# Patient Record
(demographics unavailable — no encounter records)

---

## 2025-04-03 NOTE — PHYSICAL EXAM
[Alert] : alert [Normal Voice/Communication] : normal voice/communication [Healthy Appearing] : healthy appearing [No Acute Distress] : no acute distress [Sclera] : the sclera and conjunctiva were normal [Hearing Threshold Finger Rub Not [place]] : hearing was normal [Normal Lips/Gums] : the lips and gums were normal [Oropharynx] : the oropharynx was normal [Normal Appearance] : the appearance of the neck was normal [No Neck Mass] : no neck mass was observed [No Respiratory Distress] : no respiratory distress [No Acc Muscle Use] : no accessory muscle use [Respiration, Rhythm And Depth] : normal respiratory rhythm and effort [Auscultation Breath Sounds / Voice Sounds] : lungs were clear to auscultation bilaterally [Heart Rate And Rhythm] : heart rate was normal and rhythm regular [Normal S1, S2] : normal S1 and S2 [Murmurs] : no murmurs [Bowel Sounds] : normal bowel sounds [Abdomen Tenderness] : non-tender [No Masses] : no abdominal mass palpated [Abdomen Soft] : soft [] : no hepatosplenomegaly [Oriented To Time, Place, And Person] : oriented to person, place, and time [de-identified] : Well-developed well-nourished medium frame and build.  No acute distress.  Appears stated age and weight. [FreeTextEntry1] : Anicteric sclera. [de-identified] : Moist mucosa.  No pharyngitis.  Mallampati #1. [de-identified] : Supple neck.  No adenopathy. [de-identified] : Clear. [de-identified] : No MRG. [de-identified] : No CCE. [de-identified] : Flat soft bowel sounds present.  No scars.  No organomegaly.  No hernias.  No distention. [de-identified] : Deferred to procedure. [de-identified] : Grossly intact. [de-identified] : Normal. [de-identified] : Normal. [de-identified] : Normal.

## 2025-04-03 NOTE — ASSESSMENT
[FreeTextEntry1] : Average risk for development of colorectal neoplasia.  Family history is negative for colorectal neoplasia.  Patient is asymptomatic.  Physical exam is normal.  Last colonoscopy -10 years ago.  Therefore a screening colonoscopy was offered to the patient.  The procedure, its risk benefits and prep, were explained to the patient, who understands and is agreeable to proceed.  ASA #2.  Mallampati #1.  Repeat blood work not necessary.  No clearances required.  Suprep to be utilized.  Split dose regimen.  Patient appears to be in optimal medical condition to undergo planned procedure.  Arrangements made.  Results to follow. Patient advised to take amlodipine on a.m. of procedure.(This is his only a.m. antihypertensive agent.) Patient will take other antihypertensive agents according to usual schedule on preparation day.

## 2025-04-03 NOTE — HISTORY OF PRESENT ILLNESS
[de-identified] : No prior. [FreeTextEntry1] : Colonoscopy -10 years ago elsewhere.  Unknown provider.  No report.  Done in [facility].

## 2025-07-23 NOTE — REASON FOR VISIT
[Follow-up] : a follow-up of an existing diagnosis [FreeTextEntry1] : Preop for screening colonoscopy.  Average risk,

## 2025-07-23 NOTE — HISTORY OF PRESENT ILLNESS
[FreeTextEntry1] : 4/1/2025.   Initial GI note.   65-year-old -American male with hypertension and hyperlipidemia.  On multiple stable medications.  Thought to have CVA in 12/23.  Hospitalized for neurological evaluation.  Symptoms resolved, and have never returned.  Thought to have small vessel disease.  Now only maintained on aspirin along with antihypertensive agents and statin drug. Reports that a prior colonoscopy was performed 10 years ago in [facility].  Negative.  Advised a 10-year interval examination.  Family history is negative for colorectal neoplasia. Patient is asymptomatic from a lower GI point of view.  Bowel movements are normal.  They occur daily.  No rectal bleeding.  No abdominal pain.  No weight loss.  No alteration in pattern of bowel movements.  No hemorrhoids.  Blood work performed 2 months ago with PCP was normal.  Official reports not currently available.  No upper GI symptomatology.  No history of heart disease or lung disease. Past surgical history none.  No recent hospitalizations.  7/21/2025.  Preop note for screening colonoscopy.  65-year-old -American male with hypertension hyperlipidemia and prior stroke but left with no deficits.  On appropriate medications to control blood pressure.  Asymptomatic.  Still on aspirin and aspirin has been continued through the procedure.  Patient is asymptomatic.  Family history is negative for colorectal neoplasia.  He has had a prior screening colonoscopy 10 years ago in [place] available alleged to be normal.  Cannot recall provider or location.  Results not available.  Current physical exam is normal.  Patient remains asymptomatic.  Completed Suprep.  No cardiopulmonary issues currently.

## 2025-07-23 NOTE — ASSESSMENT
[FreeTextEntry1] : Appropriate candidate for a screening colonoscopy.  Average risk.  Negative family history.  Negative screening colonoscopy 10 years ago.  Recent blood work with PCP alleged to be normal.  Patient continues aspirin through procedure.  Suprep completed.  ASA #2.  Mallampati #2.  Optimized.  Consented.

## 2025-07-23 NOTE — PHYSICAL EXAM
[Alert] : alert [Normal Voice/Communication] : normal voice/communication [Healthy Appearing] : healthy appearing [No Acute Distress] : no acute distress [Sclera] : the sclera and conjunctiva were normal [Hearing Threshold Finger Rub Not [place]] : hearing was normal [Normal Lips/Gums] : the lips and gums were normal [Oropharynx] : the oropharynx was normal [Normal Appearance] : the appearance of the neck was normal [No Neck Mass] : no neck mass was observed [No Respiratory Distress] : no respiratory distress [No Acc Muscle Use] : no accessory muscle use [Respiration, Rhythm And Depth] : normal respiratory rhythm and effort [Auscultation Breath Sounds / Voice Sounds] : lungs were clear to auscultation bilaterally [Heart Rate And Rhythm] : heart rate was normal and rhythm regular [Normal S1, S2] : normal S1 and S2 [Murmurs] : no murmurs [Bowel Sounds] : normal bowel sounds [Abdomen Tenderness] : non-tender [No Masses] : no abdominal mass palpated [Abdomen Soft] : soft [] : no hepatosplenomegaly [Oriented To Time, Place, And Person] : oriented to person, place, and time [de-identified] : Well-developed well-nourished medium frame and build.  No acute distress.  Appears stated age and weight. [FreeTextEntry1] : Anicteric sclera. [de-identified] : Moist mucosa.  No pharyngitis.  Mallampati #1. [de-identified] : Supple neck.  No adenopathy. [de-identified] : Clear. [de-identified] : No MRG. [de-identified] : No CCE. [de-identified] : Flat soft bowel sounds present.  No scars.  No organomegaly.  No hernias.  No distention. [de-identified] : No lesions.  Normal tone.  No hemorrhoids.  No blood.  Empty rectal vault.  No stool. [de-identified] : Grossly intact. [de-identified] : Normal. [de-identified] : Normal. [de-identified] : Normal.

## 2025-07-23 NOTE — PHYSICAL EXAM
[Alert] : alert [Normal Voice/Communication] : normal voice/communication [Healthy Appearing] : healthy appearing [No Acute Distress] : no acute distress [Sclera] : the sclera and conjunctiva were normal [Hearing Threshold Finger Rub Not [place]] : hearing was normal [Normal Lips/Gums] : the lips and gums were normal [Oropharynx] : the oropharynx was normal [Normal Appearance] : the appearance of the neck was normal [No Neck Mass] : no neck mass was observed [No Respiratory Distress] : no respiratory distress [No Acc Muscle Use] : no accessory muscle use [Respiration, Rhythm And Depth] : normal respiratory rhythm and effort [Auscultation Breath Sounds / Voice Sounds] : lungs were clear to auscultation bilaterally [Heart Rate And Rhythm] : heart rate was normal and rhythm regular [Normal S1, S2] : normal S1 and S2 [Murmurs] : no murmurs [Bowel Sounds] : normal bowel sounds [Abdomen Tenderness] : non-tender [No Masses] : no abdominal mass palpated [Abdomen Soft] : soft [] : no hepatosplenomegaly [Oriented To Time, Place, And Person] : oriented to person, place, and time [de-identified] : Well-developed well-nourished medium frame and build.  No acute distress.  Appears stated age and weight. [FreeTextEntry1] : Anicteric sclera. [de-identified] : Moist mucosa.  No pharyngitis.  Mallampati #1. [de-identified] : Supple neck.  No adenopathy. [de-identified] : Clear. [de-identified] : No MRG. [de-identified] : No CCE. [de-identified] : Flat soft bowel sounds present.  No scars.  No organomegaly.  No hernias.  No distention. [de-identified] : No lesions.  Normal tone.  No hemorrhoids.  No blood.  Empty rectal vault.  No stool. [de-identified] : Grossly intact. [de-identified] : Normal. [de-identified] : Normal. [de-identified] : Normal.

## 2025-07-23 NOTE — HISTORY OF PRESENT ILLNESS
[FreeTextEntry1] : 4/1/2025.   Initial GI note.   65-year-old -American male with hypertension and hyperlipidemia.  On multiple stable medications.  Thought to have CVA in 12/23.  Hospitalized for neurological evaluation.  Symptoms resolved, and have never returned.  Thought to have small vessel disease.  Now only maintained on aspirin along with antihypertensive agents and statin drug. Reports that a prior colonoscopy was performed 10 years ago in [facility].  Negative.  Advised a 10-year interval examination.  Family history is negative for colorectal neoplasia. Patient is asymptomatic from a lower GI point of view.  Bowel movements are normal.  They occur daily.  No rectal bleeding.  No abdominal pain.  No weight loss.  No alteration in pattern of bowel movements.  No hemorrhoids.  Blood work performed 2 months ago with PCP was normal.  Official reports not currently available.  No upper GI symptomatology.  No history of heart disease or lung disease. Past surgical history none.  No recent hospitalizations.  7/21/2025.  Preop note for screening colonoscopy.  65-year-old -American male with hypertension hyperlipidemia and prior stroke but left with no deficits.  On appropriate medications to control blood pressure.  Asymptomatic.  Still on aspirin and aspirin has been continued through the procedure.  Patient is asymptomatic.  Family history is negative for colorectal neoplasia.  He has had a prior screening colonoscopy 10 years ago in [place] available alleged to be normal.  Cannot recall provider or location.  Results not available.  Current physical exam is normal.  Patient remains asymptomatic.  Completed Suprep.  No cardiopulmonary issues currently.